# Patient Record
Sex: MALE | Race: WHITE | Employment: FULL TIME | URBAN - METROPOLITAN AREA
[De-identification: names, ages, dates, MRNs, and addresses within clinical notes are randomized per-mention and may not be internally consistent; named-entity substitution may affect disease eponyms.]

---

## 2017-09-25 ENCOUNTER — TELEPHONE (OUTPATIENT)
Dept: PRIMARY CARE CLINIC | Age: 67
End: 2017-09-25

## 2017-09-25 ENCOUNTER — OFFICE VISIT (OUTPATIENT)
Dept: PRIMARY CARE CLINIC | Age: 67
End: 2017-09-25
Payer: MEDICARE

## 2017-09-25 VITALS
WEIGHT: 175 LBS | HEART RATE: 76 BPM | BODY MASS INDEX: 26.52 KG/M2 | RESPIRATION RATE: 18 BRPM | HEIGHT: 68 IN | DIASTOLIC BLOOD PRESSURE: 68 MMHG | SYSTOLIC BLOOD PRESSURE: 128 MMHG

## 2017-09-25 DIAGNOSIS — R07.9 CHEST PAIN, UNSPECIFIED TYPE: Primary | ICD-10-CM

## 2017-09-25 DIAGNOSIS — K21.00 GASTROESOPHAGEAL REFLUX DISEASE WITH ESOPHAGITIS: ICD-10-CM

## 2017-09-25 PROCEDURE — 99214 OFFICE O/P EST MOD 30 MIN: CPT | Performed by: FAMILY MEDICINE

## 2017-09-25 RX ORDER — RANITIDINE 150 MG/1
150 TABLET ORAL 2 TIMES DAILY
Qty: 60 TABLET | Refills: 3 | Status: SHIPPED | OUTPATIENT
Start: 2017-09-25

## 2017-09-25 RX ORDER — ASPIRIN 81 MG/1
81 TABLET ORAL DAILY
Qty: 30 TABLET | Refills: 3 | Status: SHIPPED | OUTPATIENT
Start: 2017-09-25

## 2017-09-25 ASSESSMENT — PATIENT HEALTH QUESTIONNAIRE - PHQ9
SUM OF ALL RESPONSES TO PHQ QUESTIONS 1-9: 0
2. FEELING DOWN, DEPRESSED OR HOPELESS: 0
SUM OF ALL RESPONSES TO PHQ9 QUESTIONS 1 & 2: 0
1. LITTLE INTEREST OR PLEASURE IN DOING THINGS: 0

## 2017-09-25 ASSESSMENT — ENCOUNTER SYMPTOMS
SHORTNESS OF BREATH: 0
NAUSEA: 0
COUGH: 0
VOMITING: 0

## 2017-10-04 ENCOUNTER — HOSPITAL ENCOUNTER (OUTPATIENT)
Dept: NON INVASIVE DIAGNOSTICS | Age: 67
Discharge: HOME | End: 2017-10-04
Payer: MEDICARE

## 2017-10-04 VITALS
HEART RATE: 63 BPM | DIASTOLIC BLOOD PRESSURE: 80 MMHG | RESPIRATION RATE: 16 BRPM | BODY MASS INDEX: 26.52 KG/M2 | HEIGHT: 68 IN | WEIGHT: 175 LBS | SYSTOLIC BLOOD PRESSURE: 118 MMHG

## 2017-10-04 DIAGNOSIS — R07.9 CHEST PAIN, UNSPECIFIED TYPE: ICD-10-CM

## 2017-10-04 PROCEDURE — 93017 CV STRESS TEST TRACING ONLY: CPT

## 2017-10-04 NOTE — PROGRESS NOTES
Pt tolerated treadmill stress test without discomfort no c/o chest pain no arrhythmias note d pt recovering on cart and then will be discharged

## 2017-10-04 NOTE — PROCEDURES
[address]                [address]   [facility], [facility]                             CARDIAC STRESS TEST    PATIENT NAME: Hair Hart                     :             1950  MED REC NO:   [id number]                              ROOM:  ACCOUNT NO:   [de-identified]                            ADMISSION DATE:  10/04/2017  PROVIDER:     Prince Ogden    DATE OF STUDY:  10/04/2017    PRIMARY CARE PROVIDER:  Guille Villar MD    INTERPRETING PHYSICIAN:  Prince Melvi MD    TREADMILL STRESS TEST    100% max predicted heart rate:  155  85% max predicted heart rate:  131  Resting heart rate:  63  Maximum heart rate achieved:  155  % of age predicted maximum:  94  Resting BP:  118/80  Peak BP:  180/80  Peak double product:  27,900  Reason for termination:  85% MPHR achieved  Protocol:  David  Duration:  12:35  METS:  14.50  Resting EKG:  Sinus with RBBB  Stress heart response:  Normal response  Stress BP response:  Appropriate  Stress EKG Changes:  None  Chest discomfort:  No pain during stress    EKG IMPRESSION:  Negative    COMMENTS:  No EKG changes with exercise      JESSE WATTS    D: 10/04/2017 10:18:53       T: 10/04/2017 10:22:23     [email or web address]_EDIT  Job#: [id number]    Doc#: Unknown